# Patient Record
Sex: FEMALE | Race: WHITE | NOT HISPANIC OR LATINO | Employment: OTHER | URBAN - NONMETROPOLITAN AREA
[De-identification: names, ages, dates, MRNs, and addresses within clinical notes are randomized per-mention and may not be internally consistent; named-entity substitution may affect disease eponyms.]

---

## 2021-06-30 ENCOUNTER — OFFICE VISIT (OUTPATIENT)
Dept: CARDIOLOGY | Facility: CLINIC | Age: 76
End: 2021-06-30

## 2021-06-30 VITALS
WEIGHT: 159.8 LBS | SYSTOLIC BLOOD PRESSURE: 140 MMHG | DIASTOLIC BLOOD PRESSURE: 74 MMHG | HEIGHT: 66 IN | OXYGEN SATURATION: 97 % | HEART RATE: 81 BPM | BODY MASS INDEX: 25.68 KG/M2

## 2021-06-30 DIAGNOSIS — G45.9 TIA (TRANSIENT ISCHEMIC ATTACK): ICD-10-CM

## 2021-06-30 DIAGNOSIS — R00.0 TACHYCARDIA: Primary | ICD-10-CM

## 2021-06-30 DIAGNOSIS — I10 ESSENTIAL HYPERTENSION: ICD-10-CM

## 2021-06-30 PROCEDURE — 99204 OFFICE O/P NEW MOD 45 MIN: CPT | Performed by: PHYSICIAN ASSISTANT

## 2021-06-30 RX ORDER — LOSARTAN POTASSIUM 25 MG/1
25 TABLET ORAL DAILY
COMMUNITY

## 2021-06-30 RX ORDER — ROSUVASTATIN CALCIUM 20 MG/1
20 TABLET, COATED ORAL DAILY
COMMUNITY
End: 2022-10-27 | Stop reason: ALTCHOICE

## 2021-06-30 RX ORDER — HYDROCHLOROTHIAZIDE 25 MG/1
25 TABLET ORAL DAILY
COMMUNITY

## 2021-06-30 RX ORDER — CLOPIDOGREL BISULFATE 75 MG/1
75 TABLET ORAL DAILY
COMMUNITY

## 2021-06-30 NOTE — PROGRESS NOTES
Subjective   Beulah Leigh is a 76 y.o. female     Chief Complaint   Patient presents with   • Establish Care   • Atrial Fibrillation   • Stroke   Problem list  1.  TIA  1.1 MRI with no evidence of CVA  1.2 carotid CTA with no obstructive disease bilaterally  1.3 echocardiogram June 2021 with no evidence of mural thrombus nor septal defects  2.  Hypertension  3.  Dyslipidemia    HPI    Patient is a 76-year-old female that presents to the office for evaluation.  She has no history of coronary disease.    Patient describes having strokelike symptoms on the day of hospital admission.  Patient was at a local grocery store whenever she had visual abnormalities.  She felt like the light turned on and off.  She started having significant weakness and profound weakness in her right lower extremity.    Patient was transported to the hospital.  Patient was given TPA in the hospital.  Work-up did not demonstrate stroke.  Patient underwent extensive evaluation was discharged on dual antiplatelet therapy and statin therapy and recommendations to follow-up as an outpatient    Patient has no chest pain or pressure.  She is not had any residual neurologic abnormalities or symptoms.  She has no complaints of shortness of breath.  No PND orthopnea.    She has not felt palpitations.  She does not describe any presyncope or syncope.  Otherwise she feels remarkably well at this time      Current Outpatient Medications   Medication Sig Dispense Refill   • clopidogrel (PLAVIX) 75 MG tablet Take 75 mg by mouth Daily.     • hydroCHLOROthiazide (HYDRODIURIL) 25 MG tablet Take 25 mg by mouth Daily.     • losartan (COZAAR) 25 MG tablet Take 25 mg by mouth Daily.     • rosuvastatin (CRESTOR) 20 MG tablet Take 20 mg by mouth Daily.       No current facility-administered medications for this visit.       Azithromycin, Penicillins, and Sulfa antibiotics    Past Medical History:   Diagnosis Date   • Asthma    • GERD (gastroesophageal reflux disease)   "  • HTN (hypertension)    • Stroke (CMS/HCC)        Social History     Socioeconomic History   • Marital status:      Spouse name: Not on file   • Number of children: Not on file   • Years of education: Not on file   • Highest education level: Not on file   Tobacco Use   • Smoking status: Never Smoker   • Smokeless tobacco: Never Used   Substance and Sexual Activity   • Alcohol use: Not Currently   • Drug use: Not Currently   • Sexual activity: Defer       Family History   Problem Relation Age of Onset   • Diabetes Mother    • No Known Problems Father        Review of Systems   Constitutional: Negative.  Negative for chills, fatigue and fever.   HENT: Negative.  Negative for congestion, rhinorrhea and sore throat.    Respiratory: Positive for cough. Negative for apnea, chest tightness and shortness of breath (allergies to animals).    Cardiovascular: Negative.  Negative for chest pain, palpitations and leg swelling.   Gastrointestinal: Negative.    Endocrine: Negative.    Genitourinary: Negative.    Musculoskeletal: Positive for back pain. Negative for arthralgias, gait problem, neck pain and neck stiffness.   Skin: Negative.  Negative for rash and wound.   Allergic/Immunologic: Positive for environmental allergies. Negative for food allergies.   Neurological: Negative.  Negative for dizziness, weakness, light-headedness, numbness and headaches.   Hematological: Bruises/bleeds easily.   Psychiatric/Behavioral: Negative.  Negative for sleep disturbance.       Objective   Vitals:    06/30/21 1439   BP: 140/74   BP Location: Left arm   Patient Position: Sitting   Pulse: 81   SpO2: 97%   Weight: 72.5 kg (159 lb 12.8 oz)   Height: 167.6 cm (66\")      /74 (BP Location: Left arm, Patient Position: Sitting)   Pulse 81   Ht 167.6 cm (66\")   Wt 72.5 kg (159 lb 12.8 oz)   SpO2 97%   BMI 25.79 kg/m²     Lab Results (most recent)     None          Physical Exam  Vitals and nursing note reviewed. "   Constitutional:       General: She is not in acute distress.     Appearance: Normal appearance. She is well-developed.   HENT:      Head: Normocephalic and atraumatic.   Eyes:      General: No scleral icterus.        Right eye: No discharge.         Left eye: No discharge.      Conjunctiva/sclera: Conjunctivae normal.   Neck:      Vascular: No carotid bruit.   Cardiovascular:      Rate and Rhythm: Normal rate and regular rhythm.      Heart sounds: Normal heart sounds. No murmur heard.   No friction rub. No gallop.    Pulmonary:      Effort: Pulmonary effort is normal. No respiratory distress.      Breath sounds: Normal breath sounds. No wheezing or rales.   Chest:      Chest wall: No tenderness.   Musculoskeletal:      Right lower leg: No edema.      Left lower leg: No edema.   Skin:     General: Skin is warm and dry.      Coloration: Skin is not pale.      Findings: No erythema or rash.   Neurological:      Mental Status: She is alert and oriented to person, place, and time.      Cranial Nerves: No cranial nerve deficit.   Psychiatric:         Behavior: Behavior normal.         Procedure   Procedures         Assessment/Plan     Problems Addressed this Visit        Cardiac and Vasculature    Tachycardia - Primary    Relevant Orders    Cardiac Event Monitor    Essential hypertension    Relevant Medications    losartan (COZAAR) 25 MG tablet    hydroCHLOROthiazide (HYDRODIURIL) 25 MG tablet    Other Relevant Orders    Cardiac Event Monitor       Neuro    TIA (transient ischemic attack)    Relevant Orders    Cardiac Event Monitor      Diagnoses       Codes Comments    Tachycardia    -  Primary ICD-10-CM: R00.0  ICD-9-CM: 785.0     TIA (transient ischemic attack)     ICD-10-CM: G45.9  ICD-9-CM: 435.9     Essential hypertension     ICD-10-CM: I10  ICD-9-CM: 401.9       Recommendation  1.  Patient with TIA with symptoms that were concerning.  She also describes having history of tachycardia.  I would like to schedule for  30-day event monitor to rule out any occult atrial fibrillation or flutter as a potential embolic source.    2.  Patient with baseline hypertension doing well on current medical regimen will continue    3.  Otherwise she is doing well.  Other work-up has been benign.  She has no ischemic symptoms.  We will see her back after follow-up on event monitor.  Follow-up with primary as scheduled            Advance Care Planning   ACP discussion was held with the patient during this visit. Patient does not have an advance directive, declines further assistance.         Electronically signed by:

## 2021-06-30 NOTE — PATIENT INSTRUCTIONS
Advance Care Planning and Advance Directives     You make decisions on a daily basis - decisions about where you want to live, your career, your home, your life. Perhaps one of the most important decisions you face is your choice for future medical care. Take time to talk with your family and your healthcare team and start planning today.  Advance Care Planning is a process that can help you:  · Understand possible future healthcare decisions in light of your own experiences  · Reflect on those decision in light of your goals and values  · Discuss your decisions with those closest to you and the healthcare professionals that care for you  · Make a plan by creating a document that reflects your wishes    Surrogate Decision Maker  In the event of a medical emergency, which has left you unable to communicate or to make your own decisions, you would need someone to make decisions for you.  It is important to discuss your preferences for medical treatment with this person while you are in good health.     Qualities of a surrogate decision maker:  • Willing to take on this role and responsibility  • Knows what you want for future medical care  • Willing to follow your wishes even if they don't agree with them  • Able to make difficult medical decisions under stressful circumstances    Advance Directives  These are legal documents you can create that will guide your healthcare team and decision maker(s) when needed. These documents can be stored in the electronic medical record.    · Living Will - a legal document to guide your care if you have a terminal condition or a serious illness and are unable to communicate. States vary by statute in document names/types, but most forms may include one or more of the following:        -  Directions regarding life-prolonging treatments        -  Directions regarding artificially provided nutrition/hydration        -  Choosing a healthcare decision maker        -  Direction  regarding organ/tissue donation    · Durable Power of  for Healthcare - this document names an -in-fact to make medical decisions for you, but it may also allow this person to make personal and financial decisions for you. Please seek the advice of an  if you need this type of document.    **Advance Directives are not required and no one may discriminate against you if you do not sign one.    Medical Orders  Many states allow specific forms/orders signed by your physician to record your wishes for medical treatment in your current state of health. This form, signed in personal communication with your physician, addresses resuscitation and other medical interventions that you may or may not want.      For more information or to schedule a time with a [facility] Advance Care Planning Facilitator contact: [facility] or call [phone] and someone will contact you directly.Heart-Healthy Eating Plan  Heart-healthy meal planning includes:  · Eating less unhealthy fats.  · Eating more healthy fats.  · Making other changes in your diet.  Talk with your doctor or a diet specialist (dietitian) to create an eating plan that is right for you.  What is my plan?  Your doctor may recommend an eating plan that includes:  · Total fat: ______% or less of total calories a day.  · Saturated fat: ______% or less of total calories a day.  · Cholesterol: less than _________mg a day.  What are tips for following this plan?  Cooking  Avoid frying your food. Try to bake, boil, grill, or broil it instead. You can also reduce fat by:  · Removing the skin from poultry.  · Removing all visible fats from meats.  · Steaming vegetables in water or broth.  Meal planning    · At meals, divide your plate into four equal parts:  ? Fill one-half of your plate with vegetables and green salads.  ? Fill one-fourth of your plate with whole grains.  ? Fill one-fourth of your plate with lean protein foods.  · Eat 4-5  servings of vegetables per day. A serving of vegetables is:  ? 1 cup of raw or cooked vegetables.  ? 2 cups of raw leafy greens.  · Eat 4-5 servings of fruit per day. A serving of fruit is:  ? 1 medium whole fruit.  ? ¼ cup of dried fruit.  ? ½ cup of fresh, frozen, or canned fruit.  ? ½ cup of 100% fruit juice.  · Eat more foods that have soluble fiber. These are apples, broccoli, carrots, beans, peas, and barley. Try to get 20-30 g of fiber per day.  · Eat 4-5 servings of nuts, legumes, and seeds per week:  ? 1 serving of dried beans or legumes equals ½ cup after being cooked.  ? 1 serving of nuts is ¼ cup.  ? 1 serving of seeds equals 1 tablespoon.  General information  · Eat more home-cooked food. Eat less restaurant, buffet, and fast food.  · Limit or avoid alcohol.  · Limit foods that are high in starch and sugar.  · Avoid fried foods.  · Lose weight if you are overweight.  · Keep track of how much salt (sodium) you eat. This is important if you have high blood pressure. Ask your doctor to tell you more about this.  · Try to add vegetarian meals each week.  Fats  · Choose healthy fats. These include olive oil and canola oil, flaxseeds, walnuts, almonds, and seeds.  · Eat more omega-3 fats. These include salmon, mackerel, sardines, tuna, flaxseed oil, and ground flaxseeds. Try to eat fish at least 2 times each week.  · Check food labels. Avoid foods with trans fats or high amounts of saturated fat.  · Limit saturated fats.  ? These are often found in animal products, such as meats, butter, and cream.  ? These are also found in plant foods, such as palm oil, palm kernel oil, and coconut oil.  · Avoid foods with partially hydrogenated oils in them. These have trans fats. Examples are stick margarine, some tub margarines, cookies, crackers, and other baked goods.  What foods can I eat?  Fruits  All fresh, canned (in natural juice), or frozen fruits.  Vegetables  Fresh or frozen vegetables (raw, steamed, roasted,  or grilled). Green salads.  Grains  Most grains. Choose whole wheat and whole grains most of the time. Rice and pasta, including brown rice and pastas made with whole wheat.  Meats and other proteins  Lean, well-trimmed beef, veal, pork, and lamb. Chicken and turkey without skin. All fish and shellfish. Wild duck, rabbit, pheasant, and venison. Egg whites or low-cholesterol egg substitutes. Dried beans, peas, lentils, and tofu. Seeds and most nuts.  Dairy  Low-fat or nonfat cheeses, including ricotta and mozzarella. Skim or 1% milk that is liquid, powdered, or evaporated. Buttermilk that is made with low-fat milk. Nonfat or low-fat yogurt.  Fats and oils  Non-hydrogenated (trans-free) margarines. Vegetable oils, including soybean, sesame, sunflower, olive, peanut, safflower, corn, canola, and cottonseed. Salad dressings or mayonnaise made with a vegetable oil.  Beverages  Mineral water. Coffee and tea. Diet carbonated beverages.  Sweets and desserts  Sherbet, gelatin, and fruit ice. Small amounts of dark chocolate.  Limit all sweets and desserts.  Seasonings and condiments  All seasonings and condiments.  The items listed above may not be a complete list of foods and drinks you can eat. Contact a dietitian for more options.  What foods should I avoid?  Fruits  Canned fruit in heavy syrup. Fruit in cream or butter sauce. Fried fruit. Limit coconut.  Vegetables  Vegetables cooked in cheese, cream, or butter sauce. Fried vegetables.  Grains  Breads that are made with saturated or trans fats, oils, or whole milk. Croissants. Sweet rolls. Donuts. High-fat crackers, such as cheese crackers.  Meats and other proteins  Fatty meats, such as hot dogs, ribs, sausage, siddiqui, rib-eye roast or steak. High-fat deli meats, such as salami and bologna. Caviar. Domestic duck and goose. Organ meats, such as liver.  Dairy  Cream, sour cream, cream cheese, and creamed cottage cheese. Whole-milk cheeses. Whole or 2% milk that is liquid,  evaporated, or condensed. Whole buttermilk. Cream sauce or high-fat cheese sauce. Yogurt that is made from whole milk.  Fats and oils  Meat fat, or shortening. Cocoa butter, hydrogenated oils, palm oil, coconut oil, palm kernel oil. Solid fats and shortenings, including siddiqui fat, salt pork, lard, and butter. Nondairy cream substitutes. Salad dressings with cheese or sour cream.  Beverages  Regular sodas and juice drinks with added sugar.  Sweets and desserts  Frosting. Pudding. Cookies. Cakes. Pies. Milk chocolate or white chocolate. Buttered syrups. Full-fat ice cream or ice cream drinks.  The items listed above may not be a complete list of foods and drinks to avoid. Contact a dietitian for more information.  Summary  · Heart-healthy meal planning includes eating less unhealthy fats, eating more healthy fats, and making other changes in your diet.  · Eat a balanced diet. This includes fruits and vegetables, low-fat or nonfat dairy, lean protein, nuts and legumes, whole grains, and heart-healthy oils and fats.  This information is not intended to replace advice given to you by your health care provider. Make sure you discuss any questions you have with your health care provider.  Document Revised: 02/21/2019 Document Reviewed: 01/25/2019  [employer] Patient Education © 2021 [employer]

## 2021-08-30 ENCOUNTER — OFFICE VISIT (OUTPATIENT)
Dept: CARDIOLOGY | Facility: CLINIC | Age: 76
End: 2021-08-30

## 2021-08-30 VITALS
DIASTOLIC BLOOD PRESSURE: 76 MMHG | HEART RATE: 80 BPM | HEIGHT: 66 IN | OXYGEN SATURATION: 98 % | SYSTOLIC BLOOD PRESSURE: 143 MMHG | BODY MASS INDEX: 25.71 KG/M2 | WEIGHT: 160 LBS

## 2021-08-30 DIAGNOSIS — G45.9 TIA (TRANSIENT ISCHEMIC ATTACK): Primary | ICD-10-CM

## 2021-08-30 DIAGNOSIS — I47.1 PAROXYSMAL SVT (SUPRAVENTRICULAR TACHYCARDIA) (HCC): ICD-10-CM

## 2021-08-30 DIAGNOSIS — I10 ESSENTIAL HYPERTENSION: ICD-10-CM

## 2021-08-30 PROBLEM — I47.10 PAROXYSMAL SVT (SUPRAVENTRICULAR TACHYCARDIA): Status: ACTIVE | Noted: 2021-08-30

## 2021-08-30 PROCEDURE — 99213 OFFICE O/P EST LOW 20 MIN: CPT | Performed by: PHYSICIAN ASSISTANT

## 2021-08-30 RX ORDER — POTASSIUM CHLORIDE 750 MG/1
10 TABLET, FILM COATED, EXTENDED RELEASE ORAL DAILY
COMMUNITY
End: 2022-04-27 | Stop reason: ALTCHOICE

## 2021-08-30 RX ORDER — LORATADINE 10 MG/1
10 TABLET ORAL DAILY
COMMUNITY
End: 2022-10-27 | Stop reason: ALTCHOICE

## 2021-08-30 NOTE — PROGRESS NOTES
Problem list     Subjective   Beulah Leigh is a 76 y.o. female     Chief Complaint   Patient presents with   • Follow-up     wore cardiac monitor   Problem list  1.  TIA  1.1 MRI with no evidence of CVA  1.2 carotid CTA with no obstructive disease bilaterally  1.3 echocardiogram June 2021 with no evidence of mural thrombus nor septal defects  1.4 event monitor July 2021 demonstrating 2 episodes of SVT with one episode possibly representing questionable 2-1 flutter.  Clinical correlation recommended  2.  Hypertension  3.  Dyslipidemia    HPI    Patient is a 76-year-old female that presents back to the office for evaluation.    As above, patient presented to the office after having strokelike symptoms in which she was admitted to local hospital.  Patient was at a local grocery store whenever she started developing visual abnormalities.  She felt as if the light was turning on and off.  She started having significant weakness and profound weakness in her right lower extremity.  She presented to the hospital and was admitted and underwent significant evaluation and all of which has come back remarkably normal.  We set her up to have event monitor performed as an outpatient she was placed on antiplatelet and statin therapy    She has done relatively well.  She does not describe any chest pain or pressure.  She has very minimal to mild levels of dyspnea with nothing that has been progressive or changing at all.  Her functional status has seemingly improved.  She does not describe PND orthopnea    She does not sense palpitations.  She has not had any recurrent symptoms to suggest cerebral embolic events.  She does not describe dizziness presyncope or syncope.  And otherwise she is feeling remarkably well        Current Outpatient Medications on File Prior to Visit   Medication Sig Dispense Refill   • Calcium Polycarbophil (FIBER-CAPS PO) Take  by mouth As Needed.     • clopidogrel (PLAVIX) 75 MG tablet Take 75 mg by mouth  Daily.     • hydroCHLOROthiazide (HYDRODIURIL) 25 MG tablet Take 25 mg by mouth Daily.     • loratadine (Allergy) 10 MG tablet Take 10 mg by mouth Daily.     • losartan (COZAAR) 25 MG tablet Take 25 mg by mouth Daily.     • Multiple Vitamins-Minerals (EMERGEN-C FIVE PO) Take  by mouth Daily.     • potassium chloride 10 MEQ CR tablet Take 10 mEq by mouth Daily.     • rosuvastatin (CRESTOR) 20 MG tablet Take 20 mg by mouth Daily.       No current facility-administered medications on file prior to visit.       Azithromycin, Penicillins, and Sulfa antibiotics    Past Medical History:   Diagnosis Date   • Asthma    • GERD (gastroesophageal reflux disease)    • HTN (hypertension)    • Stroke (CMS/HCC)        Social History     Socioeconomic History   • Marital status:      Spouse name: Not on file   • Number of children: Not on file   • Years of education: Not on file   • Highest education level: Not on file   Tobacco Use   • Smoking status: Never Smoker   • Smokeless tobacco: Never Used   Substance and Sexual Activity   • Alcohol use: Not Currently   • Drug use: Not Currently   • Sexual activity: Defer       Family History   Problem Relation Age of Onset   • Diabetes Mother    • No Known Problems Father        Review of Systems   Constitutional: Negative.  Negative for chills, fatigue and fever.   HENT: Negative.  Negative for congestion, sinus pressure and sore throat.    Eyes: Positive for visual disturbance (readers).   Respiratory: Negative.  Negative for chest tightness and shortness of breath.    Cardiovascular: Negative.  Negative for chest pain, palpitations and leg swelling.   Gastrointestinal: Negative.  Negative for abdominal pain, blood in stool, constipation, diarrhea, nausea and vomiting.   Endocrine: Negative.  Negative for cold intolerance and heat intolerance.   Genitourinary: Negative.  Negative for dysuria, frequency, hematuria and urgency.   Musculoskeletal: Positive for back pain. Negative for  "arthralgias and neck pain.   Skin: Positive for wound.   Allergic/Immunologic: Positive for environmental allergies. Negative for food allergies.   Neurological: Negative.  Negative for dizziness, syncope and light-headedness.   Hematological: Bruises/bleeds easily.   Psychiatric/Behavioral: Negative.  Negative for sleep disturbance (denies waking with soa or cp).       Objective   Vitals:    08/30/21 1027   BP: 143/76   BP Location: Left arm   Patient Position: Sitting   Pulse: 80   SpO2: 98%   Weight: 72.6 kg (160 lb)   Height: 167.6 cm (66\")      /76 (BP Location: Left arm, Patient Position: Sitting)   Pulse 80   Ht 167.6 cm (66\")   Wt 72.6 kg (160 lb)   SpO2 98%   BMI 25.82 kg/m²     Lab Results (most recent)     None          Physical Exam  Vitals and nursing note reviewed.   Constitutional:       General: She is not in acute distress.     Appearance: Normal appearance. She is well-developed.   HENT:      Head: Normocephalic and atraumatic.   Eyes:      General: No scleral icterus.        Right eye: No discharge.         Left eye: No discharge.      Conjunctiva/sclera: Conjunctivae normal.   Neck:      Vascular: No carotid bruit.   Cardiovascular:      Rate and Rhythm: Normal rate and regular rhythm.      Heart sounds: Normal heart sounds. No murmur heard.   No friction rub. No gallop.    Pulmonary:      Effort: Pulmonary effort is normal. No respiratory distress.      Breath sounds: Normal breath sounds. No wheezing or rales.   Chest:      Chest wall: No tenderness.   Musculoskeletal:      Right lower leg: No edema.      Left lower leg: No edema.   Skin:     General: Skin is warm and dry.      Coloration: Skin is not pale.      Findings: No erythema or rash.   Neurological:      Mental Status: She is alert and oriented to person, place, and time.      Cranial Nerves: No cranial nerve deficit.   Psychiatric:         Behavior: Behavior normal.         Procedure   Procedures       Assessment/Plan "     Problems Addressed this Visit        Cardiac and Vasculature    Essential hypertension    Paroxysmal SVT (supraventricular tachycardia) (CMS/HCC)       Neuro    TIA (transient ischemic attack) - Primary      Diagnoses       Codes Comments    TIA (transient ischemic attack)    -  Primary ICD-10-CM: G45.9  ICD-9-CM: 435.9     Paroxysmal SVT (supraventricular tachycardia) (CMS/HCC)     ICD-10-CM: I47.1  ICD-9-CM: 427.0     Essential hypertension     ICD-10-CM: I10  ICD-9-CM: 401.9         Recommendation  1.  Patient is a 76-year-old female who presents back to the office for follow-up.  Patient has SVT on event monitor.  There is one strip concerning for possible 2-1 flutter.  I discussed findings with the patient.  With her history of TIA and concerning symptoms, the threshold to consider anticoagulation is low.  I discussed options with the patient to include EP evaluation to have them review the telemetry to see whether or not they feel it is atrial flutter or just SVT.  For now, she wants to continue medication.  She acknowledges the risk of stroke if this was atrial flutter.  She is doing well at this point we will continue antiplatelet and statin therapy.  I have reviewed symptoms of strokes with the patient.  If she experiences this, we recommend emergency room evaluation    2.  For now her blood pressure appears relatively controlled.  She has not had any more symptoms of TIAs.  For now we will continue the same and see her back for follow-up in 3 months.  She is to follow with primary as scheduled         Patient brought in medicine list to appointment, it's been reviewed with patient and med list was updated in the chart.     Advance Care Planning   ACP discussion was held with the patient during this visit. Patient does not have an advance directive, declines further assistance.         Electronically signed by:

## 2021-08-30 NOTE — PATIENT INSTRUCTIONS
Supraventricular Tachycardia, Adult  Supraventricular tachycardia (SVT) is a kind of abnormal heartbeat. It makes your heart beat very fast and then beat at a normal speed.  A normal resting heartbeat is  times a minute. This condition can make your heart beat more than 150 times a minute. Times of having a fast heartbeat (episodes) can be scary, but they are usually not dangerous. In some cases, they may lead to heart failure if:  · They happen many times per day.  · Last longer than a few seconds.  What are the causes?    A normal heartbeat starts when an area called the sinoatrial node sends out an electrical signal. In SVT, other areas of the heart send out signals that get in the way of the signal from the sinoatrial node.  What increases the risk?  You are more likely to develop this condition if you are:  · 12-30 years old.  · A woman.  The following factors may make you more likely to develop this condition:  · Stress.  · Tiredness.  · Smoking.  · Stimulant drugs, such as cocaine and methamphetamine.  · Alcohol.  · Caffeine.  · Pregnancy.  · Feeling worried or nervous (anxiety).  What are the signs or symptoms?  · A pounding heart.  · A feeling that your heart is skipping beats (palpitations).  · Weakness.  · Trouble getting enough air.  · Pain or tightness in your chest.  · Feeling like you are going to pass out (faint).  · Feeling worried or nervous.  · Dizziness.  · Sweating.  · Feeling sick to your stomach (nausea).  · Passing out.  · Tiredness.  Sometimes, there are no symptoms.  How is this treated?  · Vagal nerve stimulation. Ways to do this include:  ? Holding your breath and pushing, as though you are pooping (having a bowel movement).  ? Massaging an area on one side of your neck. Do not try this yourself. Only a doctor should do this. If done the wrong way, it can lead to a stroke.  ? Bending forward with your head between your legs.  ? Coughing while bending forward with your head between  your legs.  ? Closing your eyes and massaging your eyeballs. Ask a doctor how to do this.  · Medicines that prevent attacks.  · Medicine to stop an attack given through an IV tube at the hospital.  · A small electric shock (cardioversion) that stops an attack.  · Radiofrequency ablation. In this procedure, a small, thin tube (catheter) is used to send energy to the area that is causing the rapid heartbeats.  If you do not have symptoms, you may not need treatment.  Follow these instructions at home:  Stress  · Avoid things that make you feel stressed.  · To deal with stress, try:  ? Doing yoga or meditation, or being out in nature.  ? Listening to relaxing music.  ? Doing deep breathing.  ? Taking steps to be healthy, such as getting lots of sleep, exercising, and eating a balanced diet.  ? Talking with a mental health doctor.  Lifestyle    · Try to get at least 7 hours of sleep each night.  · Do not use any products that contain nicotine or tobacco, such as cigarettes, e-cigarettes, and chewing tobacco. If you need help quitting, ask your doctor.  · Be aware of how alcohol affects you.  ? If alcohol gives you a fast heartbeat, do not drink alcohol.  ? If alcohol does not seem to give you a fast heartbeat, limit alcohol use to no more than 1 drink a day for women who are not pregnant, and 2 drinks a day for men. In the [place] one drink is one of these:  § 12 oz of beer (355 mL).  § 5 oz of wine (148 mL).  § 1½ oz of hard liquor (44 mL).  · Be aware of how caffeine affects you.  ? If caffeine gives you a fast heartbeat, do not eat, drink, or use anything with caffeine in it.  ? If caffeine does not seem to give you a fast heartbeat, limit how much caffeine you eat, drink, or use.  · Do not use stimulant drugs. If you need help quitting, ask your doctor.  General instructions  · Stay at a healthy weight.  · Exercise regularly. Ask your doctor about good activities for you. Try one or a mixture of these:  ? 150 minutes  a week of gentle exercise, like walking or yoga.  ? 75 minutes a week of exercise that is very active, like running or swimming.  · Do vagus nerve treatments to slow down your heartbeat as told by your doctor.  · Take over-the-counter and prescription medicines only as told by your doctor.  · Keep all follow-up visits as told by your doctor. This is important.  Contact a doctor if:  · You have a fast heartbeat more often.  · Times of having a fast heartbeat last longer than before.  · Home treatments to slow down your heartbeat do not help.  · You have new symptoms.  Get help right away if:  · You have chest pain.  · Your symptoms get worse.  · You have trouble breathing.  · Your heart beats very fast for more than 20 minutes.  · You pass out.  These symptoms may be an emergency. Do not wait to see if the symptoms will go away. Get medical help right away. Call your local emergency services (911 in the [place] Do not drive yourself to the hospital.  Summary  · SVT is a type of abnormal heart beat.  · This condition can make your heart beat more than 150 times a minute.  · Treatment depends on how often the condition happens and your symptoms.  This information is not intended to replace advice given to you by your health care provider. Make sure you discuss any questions you have with your health care provider.  Document Revised: 11/05/2019 Document Reviewed: 11/05/2019  [employer] Patient Education © 2021 [employer]

## 2022-04-27 ENCOUNTER — OFFICE VISIT (OUTPATIENT)
Dept: CARDIOLOGY | Facility: CLINIC | Age: 77
End: 2022-04-27

## 2022-04-27 VITALS
HEIGHT: 66 IN | DIASTOLIC BLOOD PRESSURE: 72 MMHG | HEART RATE: 68 BPM | BODY MASS INDEX: 26.36 KG/M2 | SYSTOLIC BLOOD PRESSURE: 139 MMHG | OXYGEN SATURATION: 98 % | WEIGHT: 164 LBS

## 2022-04-27 DIAGNOSIS — I47.1 PAROXYSMAL SVT (SUPRAVENTRICULAR TACHYCARDIA): ICD-10-CM

## 2022-04-27 DIAGNOSIS — E78.5 DYSLIPIDEMIA: ICD-10-CM

## 2022-04-27 DIAGNOSIS — I10 ESSENTIAL HYPERTENSION: Primary | ICD-10-CM

## 2022-04-27 PROCEDURE — 99213 OFFICE O/P EST LOW 20 MIN: CPT | Performed by: PHYSICIAN ASSISTANT

## 2022-04-27 RX ORDER — FLUTICASONE PROPIONATE 50 MCG
2 SPRAY, SUSPENSION (ML) NASAL DAILY
COMMUNITY

## 2022-04-27 RX ORDER — MONTELUKAST SODIUM 10 MG/1
10 TABLET ORAL NIGHTLY
COMMUNITY

## 2022-04-27 RX ORDER — MOMETASONE FUROATE 50 UG/1
AEROSOL RESPIRATORY (INHALATION)
COMMUNITY

## 2022-04-27 NOTE — PROGRESS NOTES
Problem list     Subjective   Beulah Leigh is a 76 y.o. female     Chief Complaint   Patient presents with   • Paroxysmal SVT (supraventricular tachycardia)   Problem list  1.  TIA  1.1 MRI with no evidence of CVA  1.2 carotid CTA with no obstructive disease bilaterally  1.3 echocardiogram June 2021 with no evidence of mural thrombus nor septal defects  1.4 event monitor July 2021 demonstrating 2 episodes of SVT with one episode possibly representing questionable 2-1 flutter.  Clinical correlation recommended  2.  Hypertension  3.  Dyslipidemia     HPI    Patient is a 76-year-old female that presents back to the office for evaluation.     As above, patient presented to the office after having strokelike symptoms in which she was admitted to local hospital.  Patient was at a local grocery store whenever she started developing visual abnormalities.  She felt as if the light was turning on and off.  She started having significant weakness and profound weakness in her right lower extremity.  She presented to the hospital and was admitted and underwent significant evaluation and all of which has come back remarkably normal.  Patient underwent event monitoring which demonstrated episodes of SVT with 1 strip concerning for possible atrial flutter.  Patient has refused anticoagulation despite the risk of stroke    She feels well.  She has no chest pain or pressure.  She currently is on antiplatelet and statin therapy doing well with no recurrent neurologic symptoms.  She has mild dyspnea at times if she was to exert for extended levels.  No complaints of chest pain at all.  No PND or orthopnea.    She does not sense any palpitations and denies any dysrhythmic symptoms.  Otherwise patient is doing well               Current Outpatient Medications on File Prior to Visit   Medication Sig Dispense Refill   • clopidogrel (PLAVIX) 75 MG tablet Take 75 mg by mouth Daily.     • fluticasone (FLONASE) 50 MCG/ACT nasal spray 2 sprays  into the nostril(s) as directed by provider Daily.     • hydroCHLOROthiazide (HYDRODIURIL) 25 MG tablet Take 25 mg by mouth Daily.     • loratadine (CLARITIN) 10 MG tablet Take 10 mg by mouth Daily.     • losartan (COZAAR) 25 MG tablet Take 25 mg by mouth Daily.     • Mometasone Furoate (Asmanex HFA) 50 MCG/ACT aerosol Inhale.     • montelukast (SINGULAIR) 10 MG tablet Take 10 mg by mouth Every Night.     • potassium chloride (KCl) 2 mEq/mL solution oral liquid Take  by mouth Daily. 15 ml     • rosuvastatin (CRESTOR) 20 MG tablet Take 20 mg by mouth Daily.     • [DISCONTINUED] Calcium Polycarbophil (FIBER-CAPS PO) Take  by mouth As Needed.     • [DISCONTINUED] Multiple Vitamins-Minerals (EMERGEN-C FIVE PO) Take  by mouth Daily.     • [DISCONTINUED] potassium chloride 10 MEQ CR tablet Take 10 mEq by mouth Daily.       No current facility-administered medications on file prior to visit.       Azithromycin, Penicillins, and Sulfa antibiotics    Past Medical History:   Diagnosis Date   • Asthma    • GERD (gastroesophageal reflux disease)    • HTN (hypertension)    • Stroke (HCC)        Social History     Socioeconomic History   • Marital status:    Tobacco Use   • Smoking status: Never Smoker   • Smokeless tobacco: Never Used   Substance and Sexual Activity   • Alcohol use: Not Currently   • Drug use: Not Currently   • Sexual activity: Defer       Family History   Problem Relation Age of Onset   • Diabetes Mother    • No Known Problems Father        Review of Systems   Constitutional: Negative.  Negative for chills and fever.   HENT: Negative.  Negative for congestion and sinus pressure.    Respiratory: Positive for shortness of breath (asthma). Negative for chest tightness.    Cardiovascular: Negative for chest pain (when coughing), palpitations (rarely) and leg swelling.   Gastrointestinal: Negative.  Negative for blood in stool.   Endocrine: Negative.  Negative for cold intolerance and heat intolerance.  "  Genitourinary: Negative.  Negative for hematuria.   Neurological: Negative.  Negative for dizziness, syncope and light-headedness.   Psychiatric/Behavioral: Negative.  Negative for sleep disturbance (denies waking with soa or cp).       Objective   Vitals:    04/27/22 0927   BP: 139/72   BP Location: Left arm   Patient Position: Sitting   Pulse: 68   SpO2: 98%   Weight: 74.4 kg (164 lb)   Height: 167.6 cm (66\")      /72 (BP Location: Left arm, Patient Position: Sitting)   Pulse 68   Ht 167.6 cm (66\")   Wt 74.4 kg (164 lb)   SpO2 98%   BMI 26.47 kg/m²     Lab Results (most recent)     None          Physical Exam  Vitals and nursing note reviewed.   Constitutional:       General: She is not in acute distress.     Appearance: Normal appearance. She is well-developed.   HENT:      Head: Normocephalic and atraumatic.   Eyes:      General: No scleral icterus.        Right eye: No discharge.         Left eye: No discharge.      Conjunctiva/sclera: Conjunctivae normal.   Neck:      Vascular: No carotid bruit.   Cardiovascular:      Rate and Rhythm: Normal rate and regular rhythm.      Heart sounds: Normal heart sounds. No murmur heard.    No friction rub. No gallop.   Pulmonary:      Effort: Pulmonary effort is normal. No respiratory distress.      Breath sounds: Normal breath sounds. No wheezing or rales.   Chest:      Chest wall: No tenderness.   Musculoskeletal:      Right lower leg: No edema.      Left lower leg: No edema.   Skin:     General: Skin is warm and dry.      Coloration: Skin is not pale.      Findings: No erythema or rash.   Neurological:      Mental Status: She is alert and oriented to person, place, and time.      Cranial Nerves: No cranial nerve deficit.   Psychiatric:         Behavior: Behavior normal.         Procedure   Procedures       Assessment/Plan     Problems Addressed this Visit        Cardiac and Vasculature    Essential hypertension - Primary    Paroxysmal SVT (supraventricular " tachycardia) (HCC)    Dyslipidemia      Diagnoses       Codes Comments    Essential hypertension    -  Primary ICD-10-CM: I10  ICD-9-CM: 401.9     Paroxysmal SVT (supraventricular tachycardia) (HCC)     ICD-10-CM: I47.1  ICD-9-CM: 427.0     Dyslipidemia     ICD-10-CM: E78.5  ICD-9-CM: 272.4         Recommendation  1.  Patient with paroxysmal SVT by event monitoring with questionable atrial flutter.  We discussed anticoagulation and we discussed stroke.  Event monitor reviewed by Dr. Hidalgo who felt that 1 strip was concerning for possible atrial flutter.  We even discussed repeat event monitoring but she is not interested in another.  She wants to continue as she is despite the risk    2.  Patient with history of TIA currently on statin therapy with questionable dyslipidemia.  For now we will continue    3.  Baseline hypertension currently stable on current medical regimen.  We will see her back for follow-up in 6 months or sooner as symptoms discussed.  Follow-up with primary as scheduled             Patient brought in medicine bottles to appointment, they have been gone through with the patient. Med list was updated in the chart.     Advance Care Planning   ACP discussion was held with the patient during this visit. Patient does not have an advance directive, declines further assistance.                   Electronically signed by:

## 2022-10-27 ENCOUNTER — OFFICE VISIT (OUTPATIENT)
Dept: CARDIOLOGY | Facility: CLINIC | Age: 77
End: 2022-10-27

## 2022-10-27 VITALS
HEART RATE: 70 BPM | OXYGEN SATURATION: 97 % | SYSTOLIC BLOOD PRESSURE: 127 MMHG | DIASTOLIC BLOOD PRESSURE: 68 MMHG | WEIGHT: 154 LBS | BODY MASS INDEX: 24.75 KG/M2 | HEIGHT: 66 IN

## 2022-10-27 DIAGNOSIS — I10 ESSENTIAL HYPERTENSION: ICD-10-CM

## 2022-10-27 DIAGNOSIS — I47.1 PAROXYSMAL SVT (SUPRAVENTRICULAR TACHYCARDIA): Primary | ICD-10-CM

## 2022-10-27 DIAGNOSIS — G45.9 TIA (TRANSIENT ISCHEMIC ATTACK): ICD-10-CM

## 2022-10-27 PROCEDURE — 99213 OFFICE O/P EST LOW 20 MIN: CPT | Performed by: PHYSICIAN ASSISTANT

## 2022-10-27 PROCEDURE — 93000 ELECTROCARDIOGRAM COMPLETE: CPT | Performed by: PHYSICIAN ASSISTANT

## 2022-10-27 RX ORDER — FAMOTIDINE 10 MG
10 TABLET ORAL 2 TIMES DAILY
COMMUNITY

## 2022-10-27 RX ORDER — ALBUTEROL SULFATE 90 UG/1
2 AEROSOL, METERED RESPIRATORY (INHALATION) EVERY 4 HOURS PRN
COMMUNITY

## 2022-10-27 RX ORDER — CETIRIZINE HYDROCHLORIDE 10 MG/1
10 TABLET ORAL DAILY
COMMUNITY

## 2022-10-27 RX ORDER — PRAVASTATIN SODIUM 20 MG
20 TABLET ORAL DAILY
COMMUNITY

## 2022-10-27 NOTE — PROGRESS NOTES
Subjective   Beulah Leigh is a 77 y.o. female     Chief Complaint   Patient presents with   • Paroxysmal SVT (supraventricular tachycardia)    • Essential hypertension     Problem list   1.  TIA  1.1 MRI with no evidence of CVA  1.2 carotid CTA with no obstructive disease bilaterally  1.3 echocardiogram June 2021 with no evidence of mural thrombus nor septal defects  1.4 event monitor July 2021 demonstrating 2 episodes of SVT with one episode possibly representing questionable 2-1 flutter.  Clinical correlation recommended  2.  Hypertension  3.  Dyslipidemia      HPI    Patient is a 77-year-old female who presents to the office for evaluation.  She has done remarkably well.  She has no chest pain or pressure.  She describes having dyspnea when she has asthma exacerbations.  No complaints of PND orthopnea.    No complaints of palpitations or dysrhythmic symptoms.  Patient is stable otherwise.                Current Outpatient Medications on File Prior to Visit   Medication Sig Dispense Refill   • albuterol sulfate  (90 Base) MCG/ACT inhaler Inhale 2 puffs Every 4 (Four) Hours As Needed for Wheezing.     • cetirizine (zyrTEC) 10 MG tablet Take 1 tablet by mouth Daily.     • clopidogrel (PLAVIX) 75 MG tablet Take 75 mg by mouth Daily.     • famotidine (PEPCID) 10 MG tablet Take 1 tablet by mouth 2 (Two) Times a Day.     • fluticasone (FLONASE) 50 MCG/ACT nasal spray 2 sprays into the nostril(s) as directed by provider Daily.     • hydroCHLOROthiazide (HYDRODIURIL) 25 MG tablet Take 25 mg by mouth Daily.     • losartan (COZAAR) 25 MG tablet Take 25 mg by mouth Daily.     • Mometasone Furoate (Asmanex HFA) 50 MCG/ACT aerosol Inhale.     • montelukast (SINGULAIR) 10 MG tablet Take 10 mg by mouth Every Night.     • pravastatin (PRAVACHOL) 20 MG tablet Take 1 tablet by mouth Daily.     • [DISCONTINUED] loratadine (CLARITIN) 10 MG tablet Take 10 mg by mouth Daily.     • [DISCONTINUED] potassium chloride (KCl) 2  "mEq/mL solution oral liquid Take  by mouth Daily. 15 ml     • [DISCONTINUED] rosuvastatin (CRESTOR) 20 MG tablet Take 20 mg by mouth Daily.       No current facility-administered medications on file prior to visit.       Azithromycin, Penicillins, and Sulfa antibiotics    Past Medical History:   Diagnosis Date   • Asthma    • GERD (gastroesophageal reflux disease)    • HTN (hypertension)    • Hyperlipidemia    • Stroke (HCC)        Social History     Socioeconomic History   • Marital status:    Tobacco Use   • Smoking status: Never   • Smokeless tobacco: Never   Substance and Sexual Activity   • Alcohol use: Not Currently   • Drug use: Not Currently   • Sexual activity: Defer       Family History   Problem Relation Age of Onset   • Diabetes Mother    • No Known Problems Father        Review of Systems   Constitutional: Negative.  Negative for chills and fever.   HENT: Negative.  Negative for congestion and sinus pressure.    Respiratory: Positive for shortness of breath (asthma). Negative for chest tightness.    Cardiovascular: Negative.  Negative for chest pain, palpitations and leg swelling.   Gastrointestinal: Negative.  Negative for abdominal pain, blood in stool, constipation, diarrhea, nausea and vomiting.   Endocrine: Negative.  Negative for cold intolerance and heat intolerance.   Genitourinary: Negative.  Negative for dysuria, frequency, hematuria and urgency.   Musculoskeletal: Negative.  Negative for back pain and neck pain.   Neurological: Positive for dizziness (states related to medication, if bp drops). Negative for syncope and light-headedness.   Psychiatric/Behavioral: Negative.  Negative for sleep disturbance (denies waking with soa or cp).       Objective   Vitals:    10/27/22 0925   BP: 127/68   BP Location: Left arm   Patient Position: Sitting   Pulse: 70   SpO2: 97%   Weight: 69.9 kg (154 lb)   Height: 167.6 cm (66\")      /68 (BP Location: Left arm, Patient Position: Sitting)   " "Pulse 70   Ht 167.6 cm (66\")   Wt 69.9 kg (154 lb)   SpO2 97%   BMI 24.86 kg/m²     Lab Results (most recent)     None          Physical Exam  Vitals and nursing note reviewed.   Constitutional:       General: She is not in acute distress.     Appearance: Normal appearance. She is well-developed.   HENT:      Head: Normocephalic and atraumatic.   Eyes:      General: No scleral icterus.        Right eye: No discharge.         Left eye: No discharge.      Conjunctiva/sclera: Conjunctivae normal.   Neck:      Vascular: No carotid bruit.   Cardiovascular:      Rate and Rhythm: Normal rate and regular rhythm.      Heart sounds: Normal heart sounds. No murmur heard.    No friction rub. No gallop.   Pulmonary:      Effort: Pulmonary effort is normal. No respiratory distress.      Breath sounds: Normal breath sounds. No wheezing or rales.   Chest:      Chest wall: No tenderness.   Musculoskeletal:      Right lower leg: No edema.      Left lower leg: No edema.   Skin:     General: Skin is warm and dry.      Coloration: Skin is not pale.      Findings: No erythema or rash.   Neurological:      Mental Status: She is alert and oriented to person, place, and time.      Cranial Nerves: No cranial nerve deficit.   Psychiatric:         Behavior: Behavior normal.         Procedure     ECG 12 Lead    Date/Time: 10/27/2022 9:31 AM  Performed by: Fran Sierra PA  Authorized by: Fran Sierra PA   Comparison: compared with previous ECG from 6/2/2021  Comments: EKG demonstrates sinus rhythm at 65 bpm with incomplete right bundle branch block possible septal MI age-indeterminate, and no acute ST changes.               Assessment & Plan     Problems Addressed this Visit        Cardiac and Vasculature    Essential hypertension    Paroxysmal SVT (supraventricular tachycardia) (HCC) - Primary       Neuro    TIA (transient ischemic attack)   Diagnoses       Codes Comments    Paroxysmal SVT (supraventricular tachycardia) (HCC)    " -  Primary ICD-10-CM: I47.1  ICD-9-CM: 427.0     TIA (transient ischemic attack)     ICD-10-CM: G45.9  ICD-9-CM: 435.9     Essential hypertension     ICD-10-CM: I10  ICD-9-CM: 401.9           Recommendation  1.  Patient is a 77-year-old female with paroxysmal SVT.  There was 1 strip questionable for atrial flutter.  Obviously this is significant patient had a previous TIA.  We discussed anticoagulation as Dr. Reyes reviewed the monitor and there was concern about the possibility of atrial flutter.  Patient would like to stay as she is despite the fact that if she did have atrial flutter or atrial fibrillation, Plavix would not prevent a stroke.  Patient acknowledges but wants to continue as she is for now.    2.  Otherwise, continue antiplatelet and statin therapy at this time.  Patient's blood pressure currently controlled on current medical regimen.    3.  We will continue the same for now and make no changes otherwise.  We will see her back for follow-up in 6 months to a year or sooner as symptoms discussed.  Follow-up with primary as scheduled           Beulah Leigh  reports that she has never smoked. She has never used smokeless tobacco..     Patient brought in medicine bottles to appointment, they have been gone through with the patient. Med list was updated in the chart.     Advance Care Planning   ACP discussion was held with the patient during this visit. Patient has an advance directive (not in EMR), copy requested.        BMI is within normal parameters. No other follow-up for BMI required.       Electronically signed by:

## 2023-10-30 ENCOUNTER — OFFICE VISIT (OUTPATIENT)
Dept: CARDIOLOGY | Facility: CLINIC | Age: 78
End: 2023-10-30
Payer: MEDICARE

## 2023-10-30 VITALS
HEART RATE: 71 BPM | OXYGEN SATURATION: 100 % | WEIGHT: 143 LBS | SYSTOLIC BLOOD PRESSURE: 134 MMHG | HEIGHT: 65 IN | DIASTOLIC BLOOD PRESSURE: 75 MMHG | BODY MASS INDEX: 23.82 KG/M2

## 2023-10-30 DIAGNOSIS — I10 ESSENTIAL HYPERTENSION: ICD-10-CM

## 2023-10-30 DIAGNOSIS — I47.10 PAROXYSMAL SVT (SUPRAVENTRICULAR TACHYCARDIA): Primary | ICD-10-CM

## 2023-10-30 DIAGNOSIS — G45.9 TIA (TRANSIENT ISCHEMIC ATTACK): ICD-10-CM

## 2023-10-30 PROCEDURE — 93000 ELECTROCARDIOGRAM COMPLETE: CPT | Performed by: PHYSICIAN ASSISTANT

## 2023-10-30 PROCEDURE — 3075F SYST BP GE 130 - 139MM HG: CPT | Performed by: PHYSICIAN ASSISTANT

## 2023-10-30 PROCEDURE — 99213 OFFICE O/P EST LOW 20 MIN: CPT | Performed by: PHYSICIAN ASSISTANT

## 2023-10-30 PROCEDURE — 3078F DIAST BP <80 MM HG: CPT | Performed by: PHYSICIAN ASSISTANT

## 2023-10-30 RX ORDER — ROSUVASTATIN CALCIUM 20 MG/1
20 TABLET, COATED ORAL DAILY
COMMUNITY

## 2023-10-30 RX ORDER — POTASSIUM CHLORIDE 20MEQ/15ML
15 LIQUID (ML) ORAL DAILY
COMMUNITY

## 2023-10-30 NOTE — PROGRESS NOTES
Problem list     Subjective   Beulah Leigh is a 78 y.o. female     Chief Complaint   Patient presents with    Follow-up     YEARLY     Problem list   1.  TIA  1.1 MRI with no evidence of CVA  1.2 carotid CTA with no obstructive disease bilaterally  1.3 echocardiogram June 2021 with no evidence of mural thrombus nor septal defects  1.4 event monitor July 2021 demonstrating 2 episodes of SVT with one episode possibly representing questionable 2-1 flutter.  Clinical correlation recommended  2.  Hypertension  3.  Dyslipidemia  HPI    Patient is a 78-year-old female who presents to the office to be evaluated.  She has done well.  She had some chest discomfort but recently was placed on medication from her pulmonologist and her chest pain has improved.  She described being told she had air trapping and has some underlying lung disease.  She is feeling better.  She has mild amount of dyspnea.  No complaints of PND or orthopnea.    She does not describe palpitations.  She does not describe any strokelike symptoms.  Otherwise, patient is stable.      Current Outpatient Medications on File Prior to Visit   Medication Sig Dispense Refill    albuterol sulfate  (90 Base) MCG/ACT inhaler Inhale 2 puffs Every 4 (Four) Hours As Needed for Wheezing.      cetirizine (zyrTEC) 10 MG tablet Take 1 tablet by mouth Daily.      clopidogrel (PLAVIX) 75 MG tablet Take 1 tablet by mouth Daily.      famotidine (PEPCID) 10 MG tablet Take 1 tablet by mouth 2 (Two) Times a Day.      fluticasone (FLONASE) 50 MCG/ACT nasal spray 2 sprays into the nostril(s) as directed by provider Daily.      Fluticasone Furoate (ARNUITY ELLIPTA IN) Inhale.      Fluticasone-Umeclidin-Vilant (TRELEGY ELLIPTA IN) Inhale.      hydroCHLOROthiazide (HYDRODIURIL) 25 MG tablet Take 1 tablet by mouth Daily.      losartan (COZAAR) 25 MG tablet Take 1 tablet by mouth Daily.      Mometasone Furoate (Asmanex HFA) 50 MCG/ACT aerosol Inhale.      montelukast (SINGULAIR) 10  "MG tablet Take 1 tablet by mouth Every Night.      potassium chloride (KAYCIEL) 20 mEq/15 mL solution Take 15 mL by mouth Daily.      rosuvastatin (CRESTOR) 20 MG tablet Take 1 tablet by mouth Daily.      [DISCONTINUED] pravastatin (PRAVACHOL) 20 MG tablet Take 1 tablet by mouth Daily.       No current facility-administered medications on file prior to visit.       Azithromycin, Penicillins, and Sulfa antibiotics    Past Medical History:   Diagnosis Date    Asthma     COPD (chronic obstructive pulmonary disease)     GERD (gastroesophageal reflux disease)     HTN (hypertension)     Hyperlipidemia     Stroke        Social History     Socioeconomic History    Marital status:    Tobacco Use    Smoking status: Never    Smokeless tobacco: Never   Substance and Sexual Activity    Alcohol use: Not Currently    Drug use: Not Currently    Sexual activity: Defer       Family History   Problem Relation Age of Onset    Diabetes Mother     No Known Problems Father        Review of Systems   Constitutional: Negative.    HENT: Negative.     Eyes: Negative.  Negative for visual disturbance.   Respiratory:  Positive for shortness of breath and wheezing. Negative for apnea, cough and chest tightness.    Cardiovascular: Negative.  Negative for chest pain, palpitations and leg swelling.   Gastrointestinal: Negative.  Negative for blood in stool.   Endocrine: Negative.    Genitourinary: Negative.  Negative for hematuria.   Musculoskeletal: Negative.    Skin: Negative.  Negative for color change, rash and wound.   Allergic/Immunologic: Negative.    Hematological:  Bruises/bleeds easily.   Psychiatric/Behavioral: Negative.  Negative for sleep disturbance.        Objective   Vitals:    10/30/23 0934   BP: 134/75   Pulse: 71   SpO2: 100%   Weight: 64.9 kg (143 lb)   Height: 165.1 cm (65\")      /75   Pulse 71   Ht 165.1 cm (65\")   Wt 64.9 kg (143 lb)   SpO2 100%   BMI 23.80 kg/m²     Lab Results (most recent)       None      "       Physical Exam  Vitals and nursing note reviewed.   Constitutional:       General: She is not in acute distress.     Appearance: Normal appearance. She is well-developed.   HENT:      Head: Normocephalic and atraumatic.   Eyes:      General: No scleral icterus.        Right eye: No discharge.         Left eye: No discharge.      Conjunctiva/sclera: Conjunctivae normal.   Neck:      Vascular: No carotid bruit.   Cardiovascular:      Rate and Rhythm: Normal rate and regular rhythm.      Heart sounds: Normal heart sounds. No murmur heard.     No friction rub. No gallop.   Pulmonary:      Effort: Pulmonary effort is normal. No respiratory distress.      Breath sounds: Normal breath sounds. No wheezing or rales.   Chest:      Chest wall: No tenderness.   Musculoskeletal:      Right lower leg: No edema.      Left lower leg: No edema.   Skin:     General: Skin is warm and dry.      Coloration: Skin is not pale.      Findings: No erythema or rash.   Neurological:      Mental Status: She is alert and oriented to person, place, and time.      Cranial Nerves: No cranial nerve deficit.   Psychiatric:         Behavior: Behavior normal.         Procedure     ECG 12 Lead    Date/Time: 10/30/2023 9:40 AM  Performed by: Fran Sierra PA    Authorized by: Fran Sierra PA  Comparison: compared with previous ECG from 10/27/2022  Comments: EKG demonstrates sinus rhythm at 77 bpm with no acute ST changes             Assessment & Plan     Problems Addressed this Visit          Cardiac and Vasculature    Essential hypertension    Paroxysmal SVT (supraventricular tachycardia) - Primary       Neuro    TIA (transient ischemic attack)     Diagnoses         Codes Comments    Paroxysmal SVT (supraventricular tachycardia)    -  Primary ICD-10-CM: I47.10  ICD-9-CM: 427.0     TIA (transient ischemic attack)     ICD-10-CM: G45.9  ICD-9-CM: 435.9     Essential hypertension     ICD-10-CM: I10  ICD-9-CM: 401.9              Recommendation  1.  Patient is a 78-year-old female with history of SVT.  Patient has done well with no significant palpitation symptoms at this point.  For now, we can monitor.    2.  History of TIA with work-up being benign.  There was one rhythm strip read by Dr. Hidalgo in which SVT could have possibly been atrial flutter.  Regardless, we discussed anticoagulation with the patient and she refuses.  She has done well.  We did review strokelike symptoms and if they occur, to activate emergency medical services.    3.  Patient with baseline hypertension doing well.  We will monitor for now.  We will see patient back for follow-up in 6 months to a year or sooner as symptoms discussed.  Follow-up with primary as scheduled.         Patient brought in medicine bottles to appointment, they have been gone through with the patient. Med list was updated in the chart.      Advance Care Planning   ACP discussion was declined by the patient. Patient has an advance directive (not in EMR), copy requested.        Electronically signed by:

## 2023-10-30 NOTE — LETTER
October 30, 2023       No Recipients    Patient: Beulah Leigh   YOB: 1945   Date of Visit: 10/30/2023       Dear MARIA DEL ROSARIO Rothman    Beulah Leigh was in my office today. Below is a copy of my note.    If you have questions, please do not hesitate to call me. I look forward to following Beulah along with you.         Sincerely,        KRISTIN Romano        CC:   No Recipients    Problem list     Subjective  Beulah Leigh is a 78 y.o. female     Chief Complaint   Patient presents with   • Follow-up     YEARLY     Problem list   1.  TIA  1.1 MRI with no evidence of CVA  1.2 carotid CTA with no obstructive disease bilaterally  1.3 echocardiogram June 2021 with no evidence of mural thrombus nor septal defects  1.4 event monitor July 2021 demonstrating 2 episodes of SVT with one episode possibly representing questionable 2-1 flutter.  Clinical correlation recommended  2.  Hypertension  3.  Dyslipidemia  HPI    Patient is a 78-year-old female who presents to the office to be evaluated.  She has done well.  She had some chest discomfort but recently was placed on medication from her pulmonologist and her chest pain has improved.  She described being told she had air trapping and has some underlying lung disease.  She is feeling better.  She has mild amount of dyspnea.  No complaints of PND or orthopnea.    She does not describe palpitations.  She does not describe any strokelike symptoms.  Otherwise, patient is stable.      Current Outpatient Medications on File Prior to Visit   Medication Sig Dispense Refill   • albuterol sulfate  (90 Base) MCG/ACT inhaler Inhale 2 puffs Every 4 (Four) Hours As Needed for Wheezing.     • cetirizine (zyrTEC) 10 MG tablet Take 1 tablet by mouth Daily.     • clopidogrel (PLAVIX) 75 MG tablet Take 1 tablet by mouth Daily.     • famotidine (PEPCID) 10 MG tablet Take 1 tablet by mouth 2 (Two) Times a Day.     • fluticasone (FLONASE) 50 MCG/ACT nasal spray 2  sprays into the nostril(s) as directed by provider Daily.     • Fluticasone Furoate (ARNUITY ELLIPTA IN) Inhale.     • Fluticasone-Umeclidin-Vilant (TRELEGY ELLIPTA IN) Inhale.     • hydroCHLOROthiazide (HYDRODIURIL) 25 MG tablet Take 1 tablet by mouth Daily.     • losartan (COZAAR) 25 MG tablet Take 1 tablet by mouth Daily.     • Mometasone Furoate (Asmanex HFA) 50 MCG/ACT aerosol Inhale.     • montelukast (SINGULAIR) 10 MG tablet Take 1 tablet by mouth Every Night.     • potassium chloride (KAYCIEL) 20 mEq/15 mL solution Take 15 mL by mouth Daily.     • rosuvastatin (CRESTOR) 20 MG tablet Take 1 tablet by mouth Daily.     • [DISCONTINUED] pravastatin (PRAVACHOL) 20 MG tablet Take 1 tablet by mouth Daily.       No current facility-administered medications on file prior to visit.       Azithromycin, Penicillins, and Sulfa antibiotics    Past Medical History:   Diagnosis Date   • Asthma    • COPD (chronic obstructive pulmonary disease)    • GERD (gastroesophageal reflux disease)    • HTN (hypertension)    • Hyperlipidemia    • Stroke        Social History     Socioeconomic History   • Marital status:    Tobacco Use   • Smoking status: Never   • Smokeless tobacco: Never   Substance and Sexual Activity   • Alcohol use: Not Currently   • Drug use: Not Currently   • Sexual activity: Defer       Family History   Problem Relation Age of Onset   • Diabetes Mother    • No Known Problems Father        Review of Systems   Constitutional: Negative.    HENT: Negative.     Eyes: Negative.  Negative for visual disturbance.   Respiratory:  Positive for shortness of breath and wheezing. Negative for apnea, cough and chest tightness.    Cardiovascular: Negative.  Negative for chest pain, palpitations and leg swelling.   Gastrointestinal: Negative.  Negative for blood in stool.   Endocrine: Negative.    Genitourinary: Negative.  Negative for hematuria.   Musculoskeletal: Negative.    Skin: Negative.  Negative for color change,  "rash and wound.   Allergic/Immunologic: Negative.    Hematological:  Bruises/bleeds easily.   Psychiatric/Behavioral: Negative.  Negative for sleep disturbance.        Objective  Vitals:    10/30/23 0934   BP: 134/75   Pulse: 71   SpO2: 100%   Weight: 64.9 kg (143 lb)   Height: 165.1 cm (65\")      /75   Pulse 71   Ht 165.1 cm (65\")   Wt 64.9 kg (143 lb)   SpO2 100%   BMI 23.80 kg/m²     Lab Results (most recent)       None            Physical Exam  Vitals and nursing note reviewed.   Constitutional:       General: She is not in acute distress.     Appearance: Normal appearance. She is well-developed.   HENT:      Head: Normocephalic and atraumatic.   Eyes:      General: No scleral icterus.        Right eye: No discharge.         Left eye: No discharge.      Conjunctiva/sclera: Conjunctivae normal.   Neck:      Vascular: No carotid bruit.   Cardiovascular:      Rate and Rhythm: Normal rate and regular rhythm.      Heart sounds: Normal heart sounds. No murmur heard.     No friction rub. No gallop.   Pulmonary:      Effort: Pulmonary effort is normal. No respiratory distress.      Breath sounds: Normal breath sounds. No wheezing or rales.   Chest:      Chest wall: No tenderness.   Musculoskeletal:      Right lower leg: No edema.      Left lower leg: No edema.   Skin:     General: Skin is warm and dry.      Coloration: Skin is not pale.      Findings: No erythema or rash.   Neurological:      Mental Status: She is alert and oriented to person, place, and time.      Cranial Nerves: No cranial nerve deficit.   Psychiatric:         Behavior: Behavior normal.         Procedure    ECG 12 Lead    Date/Time: 10/30/2023 9:40 AM  Performed by: Fran Sierra PA    Authorized by: Fran Sierra PA  Comparison: compared with previous ECG from 10/27/2022  Comments: EKG demonstrates sinus rhythm at 77 bpm with no acute ST changes             Assessment & Plan    Problems Addressed this Visit          Cardiac and " Vasculature    Essential hypertension    Paroxysmal SVT (supraventricular tachycardia) - Primary       Neuro    TIA (transient ischemic attack)     Diagnoses         Codes Comments    Paroxysmal SVT (supraventricular tachycardia)    -  Primary ICD-10-CM: I47.10  ICD-9-CM: 427.0     TIA (transient ischemic attack)     ICD-10-CM: G45.9  ICD-9-CM: 435.9     Essential hypertension     ICD-10-CM: I10  ICD-9-CM: 401.9             Recommendation  1.  Patient is a 78-year-old female with history of SVT.  Patient has done well with no significant palpitation symptoms at this point.  For now, we can monitor.    2.  History of TIA with work-up being benign.  There was one rhythm strip read by Dr. Hidalgo in which SVT could have possibly been atrial flutter.  Regardless, we discussed anticoagulation with the patient and she refuses.  She has done well.  We did review strokelike symptoms and if they occur, to activate emergency medical services.    3.  Patient with baseline hypertension doing well.  We will monitor for now.  We will see patient back for follow-up in 6 months to a year or sooner as symptoms discussed.  Follow-up with primary as scheduled.         Patient brought in medicine bottles to appointment, they have been gone through with the patient. Med list was updated in the chart.      Advance Care Planning  ACP discussion was declined by the patient. Patient has an advance directive (not in EMR), copy requested.        Electronically signed by:

## 2024-05-07 ENCOUNTER — TELEPHONE (OUTPATIENT)
Dept: CARDIOLOGY | Facility: CLINIC | Age: 79
End: 2024-05-07
Payer: MEDICARE

## 2024-11-20 ENCOUNTER — OFFICE VISIT (OUTPATIENT)
Dept: CARDIOLOGY | Facility: CLINIC | Age: 79
End: 2024-11-20
Payer: MEDICARE

## 2024-11-20 VITALS
SYSTOLIC BLOOD PRESSURE: 139 MMHG | WEIGHT: 145 LBS | HEART RATE: 76 BPM | BODY MASS INDEX: 24.13 KG/M2 | OXYGEN SATURATION: 97 % | DIASTOLIC BLOOD PRESSURE: 72 MMHG

## 2024-11-20 DIAGNOSIS — E78.5 DYSLIPIDEMIA: ICD-10-CM

## 2024-11-20 DIAGNOSIS — I47.10 PAROXYSMAL SVT (SUPRAVENTRICULAR TACHYCARDIA): Primary | ICD-10-CM

## 2024-11-20 DIAGNOSIS — I10 ESSENTIAL HYPERTENSION: ICD-10-CM

## 2024-11-20 PROCEDURE — 3078F DIAST BP <80 MM HG: CPT | Performed by: PHYSICIAN ASSISTANT

## 2024-11-20 PROCEDURE — 3075F SYST BP GE 130 - 139MM HG: CPT | Performed by: PHYSICIAN ASSISTANT

## 2024-11-20 PROCEDURE — 99213 OFFICE O/P EST LOW 20 MIN: CPT | Performed by: PHYSICIAN ASSISTANT

## 2024-11-20 PROCEDURE — 93000 ELECTROCARDIOGRAM COMPLETE: CPT | Performed by: PHYSICIAN ASSISTANT

## 2024-11-20 NOTE — LETTER
"November 20, 2024     MARIA DEL ROSARIO Rothman  [address] [place] [id number]    Patient: Beulah Leigh   YOB: 1945   Date of Visit: 11/20/2024       Dear MARIA DEL ROSARIO Rothman    Beulah Leigh was in my office today. Below is a copy of my note.    If you have questions, please do not hesitate to call me. I look forward to following Beulah along with you.         Sincerely,        KRISTIN Romano        CC: No Recipients    Problem list     Subjective  Beulah Leigh is a 79 y.o. female     Chief Complaint   Patient presents with   • 1 year follow up   • Essential HTN     Problem list   1.  TIA  1.1 MRI with no evidence of CVA  1.2 carotid CTA with no obstructive disease bilaterally  1.3 echocardiogram June 2021 with no evidence of mural thrombus nor septal defects  1.4 event monitor July 2021 demonstrating 2 episodes of SVT with one episode possibly representing questionable 2-1 flutter.  Clinical correlation recommended  2.  Hypertension  3.  Dyslipidemia    HPI    Patient is a 79-year-old female presenting back to the office for follow-up.  She has felt remarkably well.  She does not describe any ischemic chest pain.  She occasionally will have \"heartburn\" and feels some discomfort in her chest.  She does not describe chest pain otherwise.  She has no complaints of dyspnea.  No PND, orthopnea, or edema.    She does not describe palpitating.  She could occasionally have orthostatic dizziness.  She does not describe any syncopal episodes.  Overall, patient is felt remarkably well.      Current Outpatient Medications on File Prior to Visit   Medication Sig Dispense Refill   • albuterol sulfate  (90 Base) MCG/ACT inhaler Inhale 2 puffs Every 4 (Four) Hours As Needed for Wheezing.     • cetirizine (zyrTEC) 10 MG tablet Take 1 tablet by mouth Daily.     • clopidogrel (PLAVIX) 75 MG tablet Take 1 tablet by mouth Daily.     • famotidine (PEPCID) 10 MG tablet Take 2 tablets by mouth 2 " (Two) Times a Day.     • fluticasone (FLONASE) 50 MCG/ACT nasal spray Administer 2 sprays into the nostril(s) as directed by provider Daily.     • Fluticasone Furoate (ARNUITY ELLIPTA IN) Inhale.     • Fluticasone-Umeclidin-Vilant (TRELEGY ELLIPTA IN) Inhale.     • hydroCHLOROthiazide (HYDRODIURIL) 25 MG tablet Take 1 tablet by mouth Daily.     • losartan (COZAAR) 25 MG tablet Take 1 tablet by mouth Daily.     • Mometasone Furoate (Asmanex HFA) 50 MCG/ACT aerosol Inhale.     • montelukast (SINGULAIR) 10 MG tablet Take 1 tablet by mouth Every Night.     • potassium chloride (KAYCIEL) 20 mEq/15 mL solution Take 15 mL by mouth Daily.     • rosuvastatin (CRESTOR) 20 MG tablet Take 1 tablet by mouth Daily.       No current facility-administered medications on file prior to visit.       Azithromycin, Penicillins, and Sulfa antibiotics    Past Medical History:   Diagnosis Date   • Asthma    • COPD (chronic obstructive pulmonary disease)    • GERD (gastroesophageal reflux disease)    • HTN (hypertension)    • Hyperlipidemia    • Stroke        Social History     Socioeconomic History   • Marital status:    Tobacco Use   • Smoking status: Never   • Smokeless tobacco: Never   Substance and Sexual Activity   • Alcohol use: Not Currently   • Drug use: Not Currently   • Sexual activity: Defer       Family History   Problem Relation Age of Onset   • Diabetes Mother    • No Known Problems Father        Review of Systems   Constitutional:  Negative for activity change, appetite change, chills, fatigue and fever.   HENT: Negative.  Negative for congestion.    Eyes: Negative.  Negative for visual disturbance.   Respiratory: Negative.  Negative for apnea, cough, chest tightness, shortness of breath and wheezing.    Cardiovascular: Negative.  Positive for chest pain. Negative for palpitations and leg swelling.   Gastrointestinal: Negative.  Negative for blood in stool.   Endocrine: Negative.  Negative for cold intolerance and  heat intolerance.   Genitourinary: Negative.  Negative for hematuria.   Musculoskeletal: Negative.  Negative for gait problem.   Skin: Negative.  Negative for color change, rash and wound.   Allergic/Immunologic: Negative.  Negative for environmental allergies and food allergies.   Neurological:  Positive for dizziness. Negative for syncope, weakness, light-headedness, numbness and headaches.   Hematological:  Bruises/bleeds easily.   Psychiatric/Behavioral: Negative.  Negative for sleep disturbance.        Objective  Vitals:    11/20/24 0938   BP: 139/72   BP Location: Right arm   Patient Position: Sitting   Cuff Size: Adult   Pulse: 76   SpO2: 97%   Weight: 65.8 kg (145 lb)      /72 (BP Location: Right arm, Patient Position: Sitting, Cuff Size: Adult)   Pulse 76   Wt 65.8 kg (145 lb)   SpO2 97%   BMI 24.13 kg/m²     Lab Results (most recent)       None            Physical Exam  Vitals and nursing note reviewed.   Constitutional:       General: She is not in acute distress.     Appearance: Normal appearance. She is well-developed.   HENT:      Head: Normocephalic and atraumatic.   Eyes:      General: No scleral icterus.        Right eye: No discharge.         Left eye: No discharge.      Conjunctiva/sclera: Conjunctivae normal.   Neck:      Vascular: No carotid bruit.   Cardiovascular:      Rate and Rhythm: Normal rate and regular rhythm.      Heart sounds: Normal heart sounds. No murmur heard.     No friction rub. No gallop.   Pulmonary:      Effort: Pulmonary effort is normal. No respiratory distress.      Breath sounds: Normal breath sounds. No wheezing or rales.   Chest:      Chest wall: No tenderness.   Musculoskeletal:      Right lower leg: No edema.      Left lower leg: No edema.   Skin:     General: Skin is warm and dry.      Coloration: Skin is not pale.      Findings: No erythema or rash.   Neurological:      Mental Status: She is alert and oriented to person, place, and time.      Cranial  Nerves: No cranial nerve deficit.   Psychiatric:         Behavior: Behavior normal.         Procedure    ECG 12 Lead    Date/Time: 11/20/2024 9:42 AM  Performed by: Fran Sierra PA    Authorized by: Fran Sierra PA  Comparison: compared with previous ECG from 10/30/2023  Comparison to previous ECG: EKG demonstrates sinus rhythm at 70 bpm with PVCs and no acute ST changes             Assessment & Plan    Problems Addressed this Visit          Cardiac and Vasculature    Essential hypertension    Paroxysmal SVT (supraventricular tachycardia) - Primary    Dyslipidemia     Diagnoses         Codes Comments    Paroxysmal SVT (supraventricular tachycardia)    -  Primary ICD-10-CM: I47.10  ICD-9-CM: 427.0     Essential hypertension     ICD-10-CM: I10  ICD-9-CM: 401.9     Dyslipidemia     ICD-10-CM: E78.5  ICD-9-CM: 272.4           Recommendations  1.  Patient is a 79-year-old female presenting back to the office for evaluation.  Patient is stable.  Patient does complain of any ischemic chest pain.  She has atypical pain whenever she has indigestion or heartburn.  For now, we can monitor and let symptoms were to change.    2.  Patient with history of SVT.  No complaints of palpitations or dysrhythmic symptoms.  We can monitor for now.    3.  She has occasional orthostatic dizziness but is stable.  We can monitor for now.    4.  Patient with dyslipidemia currently on statin therapy.  We will continue.    5.  We will see patient back for follow-up in 6 months.  Follow-up with primary as scheduled.      Patient brought in medicine bottles to appointment, they have been gone through with the patient. Med list was updated in the chart.      Advance Care Planning  ACP discussion was declined by the patient. Patient has an advance directive (not in EMR), copy requested.        Electronically signed by:

## 2024-11-20 NOTE — PROGRESS NOTES
"Problem list     Subjective   Beulah Leigh is a 79 y.o. female     Chief Complaint   Patient presents with    1 year follow up    Essential HTN     Problem list   1.  TIA  1.1 MRI with no evidence of CVA  1.2 carotid CTA with no obstructive disease bilaterally  1.3 echocardiogram June 2021 with no evidence of mural thrombus nor septal defects  1.4 event monitor July 2021 demonstrating 2 episodes of SVT with one episode possibly representing questionable 2-1 flutter.  Clinical correlation recommended  2.  Hypertension  3.  Dyslipidemia    HPI    Patient is a 79-year-old female presenting back to the office for follow-up.  She has felt remarkably well.  She does not describe any ischemic chest pain.  She occasionally will have \"heartburn\" and feels some discomfort in her chest.  She does not describe chest pain otherwise.  She has no complaints of dyspnea.  No PND, orthopnea, or edema.    She does not describe palpitating.  She could occasionally have orthostatic dizziness.  She does not describe any syncopal episodes.  Overall, patient is felt remarkably well.      Current Outpatient Medications on File Prior to Visit   Medication Sig Dispense Refill    albuterol sulfate  (90 Base) MCG/ACT inhaler Inhale 2 puffs Every 4 (Four) Hours As Needed for Wheezing.      cetirizine (zyrTEC) 10 MG tablet Take 1 tablet by mouth Daily.      clopidogrel (PLAVIX) 75 MG tablet Take 1 tablet by mouth Daily.      famotidine (PEPCID) 10 MG tablet Take 2 tablets by mouth 2 (Two) Times a Day.      fluticasone (FLONASE) 50 MCG/ACT nasal spray Administer 2 sprays into the nostril(s) as directed by provider Daily.      Fluticasone Furoate (ARNUITY ELLIPTA IN) Inhale.      Fluticasone-Umeclidin-Vilant (TRELEGY ELLIPTA IN) Inhale.      hydroCHLOROthiazide (HYDRODIURIL) 25 MG tablet Take 1 tablet by mouth Daily.      losartan (COZAAR) 25 MG tablet Take 1 tablet by mouth Daily.      Mometasone Furoate (Asmanex HFA) 50 MCG/ACT aerosol " Inhale.      montelukast (SINGULAIR) 10 MG tablet Take 1 tablet by mouth Every Night.      potassium chloride (KAYCIEL) 20 mEq/15 mL solution Take 15 mL by mouth Daily.      rosuvastatin (CRESTOR) 20 MG tablet Take 1 tablet by mouth Daily.       No current facility-administered medications on file prior to visit.       Azithromycin, Penicillins, and Sulfa antibiotics    Past Medical History:   Diagnosis Date    Asthma     COPD (chronic obstructive pulmonary disease)     GERD (gastroesophageal reflux disease)     HTN (hypertension)     Hyperlipidemia     Stroke        Social History     Socioeconomic History    Marital status:    Tobacco Use    Smoking status: Never    Smokeless tobacco: Never   Substance and Sexual Activity    Alcohol use: Not Currently    Drug use: Not Currently    Sexual activity: Defer       Family History   Problem Relation Age of Onset    Diabetes Mother     No Known Problems Father        Review of Systems   Constitutional:  Negative for activity change, appetite change, chills, fatigue and fever.   HENT: Negative.  Negative for congestion.    Eyes: Negative.  Negative for visual disturbance.   Respiratory: Negative.  Negative for apnea, cough, chest tightness, shortness of breath and wheezing.    Cardiovascular: Negative.  Positive for chest pain. Negative for palpitations and leg swelling.   Gastrointestinal: Negative.  Negative for blood in stool.   Endocrine: Negative.  Negative for cold intolerance and heat intolerance.   Genitourinary: Negative.  Negative for hematuria.   Musculoskeletal: Negative.  Negative for gait problem.   Skin: Negative.  Negative for color change, rash and wound.   Allergic/Immunologic: Negative.  Negative for environmental allergies and food allergies.   Neurological:  Positive for dizziness. Negative for syncope, weakness, light-headedness, numbness and headaches.   Hematological:  Bruises/bleeds easily.   Psychiatric/Behavioral: Negative.  Negative for  sleep disturbance.        Objective   Vitals:    11/20/24 0938   BP: 139/72   BP Location: Right arm   Patient Position: Sitting   Cuff Size: Adult   Pulse: 76   SpO2: 97%   Weight: 65.8 kg (145 lb)      /72 (BP Location: Right arm, Patient Position: Sitting, Cuff Size: Adult)   Pulse 76   Wt 65.8 kg (145 lb)   SpO2 97%   BMI 24.13 kg/m²     Lab Results (most recent)       None            Physical Exam  Vitals and nursing note reviewed.   Constitutional:       General: She is not in acute distress.     Appearance: Normal appearance. She is well-developed.   HENT:      Head: Normocephalic and atraumatic.   Eyes:      General: No scleral icterus.        Right eye: No discharge.         Left eye: No discharge.      Conjunctiva/sclera: Conjunctivae normal.   Neck:      Vascular: No carotid bruit.   Cardiovascular:      Rate and Rhythm: Normal rate and regular rhythm.      Heart sounds: Normal heart sounds. No murmur heard.     No friction rub. No gallop.   Pulmonary:      Effort: Pulmonary effort is normal. No respiratory distress.      Breath sounds: Normal breath sounds. No wheezing or rales.   Chest:      Chest wall: No tenderness.   Musculoskeletal:      Right lower leg: No edema.      Left lower leg: No edema.   Skin:     General: Skin is warm and dry.      Coloration: Skin is not pale.      Findings: No erythema or rash.   Neurological:      Mental Status: She is alert and oriented to person, place, and time.      Cranial Nerves: No cranial nerve deficit.   Psychiatric:         Behavior: Behavior normal.         Procedure     ECG 12 Lead    Date/Time: 11/20/2024 9:42 AM  Performed by: Fran Sierra PA    Authorized by: Fran Sierra PA  Comparison: compared with previous ECG from 10/30/2023  Comparison to previous ECG: EKG demonstrates sinus rhythm at 70 bpm with PVCs and no acute ST changes             Assessment & Plan     Problems Addressed this Visit          Cardiac and Vasculature     Essential hypertension    Paroxysmal SVT (supraventricular tachycardia) - Primary    Dyslipidemia     Diagnoses         Codes Comments    Paroxysmal SVT (supraventricular tachycardia)    -  Primary ICD-10-CM: I47.10  ICD-9-CM: 427.0     Essential hypertension     ICD-10-CM: I10  ICD-9-CM: 401.9     Dyslipidemia     ICD-10-CM: E78.5  ICD-9-CM: 272.4           Recommendations  1.  Patient is a 79-year-old female presenting back to the office for evaluation.  Patient is stable.  Patient does complain of any ischemic chest pain.  She has atypical pain whenever she has indigestion or heartburn.  For now, we can monitor and let symptoms were to change.    2.  Patient with history of SVT.  No complaints of palpitations or dysrhythmic symptoms.  We can monitor for now.    3.  She has occasional orthostatic dizziness but is stable.  We can monitor for now.    4.  Patient with dyslipidemia currently on statin therapy.  We will continue.    5.  We will see patient back for follow-up in 6 months.  Follow-up with primary as scheduled.      Patient brought in medicine bottles to appointment, they have been gone through with the patient. Med list was updated in the chart.      Advance Care Planning   ACP discussion was declined by the patient. Patient has an advance directive (not in EMR), copy requested.        Electronically signed by: